# Patient Record
Sex: FEMALE | Race: BLACK OR AFRICAN AMERICAN | Employment: FULL TIME | URBAN - METROPOLITAN AREA
[De-identification: names, ages, dates, MRNs, and addresses within clinical notes are randomized per-mention and may not be internally consistent; named-entity substitution may affect disease eponyms.]

---

## 2021-08-27 ENCOUNTER — HOSPITAL ENCOUNTER (OUTPATIENT)
Dept: PREADMISSION TESTING | Age: 41
Discharge: HOME | End: 2021-08-27
Payer: COMMERCIAL

## 2021-08-27 PROCEDURE — U0005 INFEC AGEN DETEC AMPLI PROBE: HCPCS

## 2021-08-28 LAB — SARS-COV-2, COV2NT: NOT DETECTED

## 2021-08-30 RX ORDER — DEXTROMETHORPHAN/PSEUDOEPHED 2.5-7.5/.8
1.2 DROPS ORAL
Status: CANCELLED | OUTPATIENT
Start: 2021-08-30

## 2021-08-30 RX ORDER — SODIUM CHLORIDE 0.9 % (FLUSH) 0.9 %
5-40 SYRINGE (ML) INJECTION EVERY 8 HOURS
Status: CANCELLED | OUTPATIENT
Start: 2021-08-30

## 2021-08-30 RX ORDER — EPINEPHRINE 0.1 MG/ML
1 INJECTION INTRACARDIAC; INTRAVENOUS
Status: CANCELLED | OUTPATIENT
Start: 2021-08-30 | End: 2021-08-31

## 2021-08-30 RX ORDER — SODIUM CHLORIDE 0.9 % (FLUSH) 0.9 %
5-40 SYRINGE (ML) INJECTION AS NEEDED
Status: CANCELLED | OUTPATIENT
Start: 2021-08-30

## 2021-08-30 RX ORDER — DIPHENHYDRAMINE HYDROCHLORIDE 50 MG/ML
50 INJECTION, SOLUTION INTRAMUSCULAR; INTRAVENOUS ONCE
Status: CANCELLED | OUTPATIENT
Start: 2021-08-30 | End: 2021-08-30

## 2021-08-30 RX ORDER — ATROPINE SULFATE 0.1 MG/ML
0.5 INJECTION INTRAVENOUS
Status: CANCELLED | OUTPATIENT
Start: 2021-08-30 | End: 2021-08-31

## 2021-08-30 NOTE — H&P
Assessment/Plan  # Detail Type Description    1. Assessment Iron deficiency anemia, unspecified (D50.9). Impression Pt of Dr. David Forbes, referred to GI for colonoscopy to evaluate ERIN. She states that she feels this chronic ERIN is most likely due to her heavy and persistent menses. She has never had an EGD or c-scope before. Pt reports having Dizziness and Lightheadedness prior to IV iron infusions (April, May, and one scheduled next Month). Now she only has PICA to ice as remaining anemia symptom. HGB- 6.8L, MCV- 62, %Sat-3L , Ferritin- L, Vit B12- NL, otherwise labs normal (Per Dr. David Forbes)    Average risk, no FHx of colon cancer. Asymptomatic of GI complaints. BMI:  , BM: 2x daily. PM/SH: ADD, Depression, Bipolar, Vit D Deficiency,  (), Ectopic Pregnancy s/p LSO (), L. Breast Biopsy: Negative (). No reported hx of strokes, or CVD. Patient Plan *C-scope Plan:  First colonoscopy ordered with Dr. Juan David Guy with Miralax bowel prep, and Mag Citrate 2 days before prep, and Miralax and stool softeners starting 3 days before prep. *C-scope Risks:  Stressed importance of following all bowel preparation instructions. Explained the procedure to the patient including all risks and benefits. These risks consist of missed lesions on exam, bleeding, and bowel perforation with possible need for admission to the hospital, and in the most extensive of  circumstances, the patient may require surgery. Pt verbalized understanding of these risks and is agreeable with this procedure    Plan Orders Further diagnostic evaluations ordered today include(s) DIAGNOSTIC COLONOSCOPY to be performed. 2. Assessment Body mass index [BMI] 38.0-38.9, adult (L08.04). Impression BMI: 38.5, Pt is obese. This 38year old  patient was referred by Freida Calhoun. This 38year old female presents for Anemia. History of Present Illness  1. Anemia   The anemia began in .  The symptom(s) began gradual. The patient describes it as a severity of severe. Type of anemia was acquired for deficiency anemia (iron deficient). The problem is a new diagnosis. Relevant medical history includes history of anemia. Associated symptoms include cold intolerance, dizziness and pica. Pertinent negatives include abdominal pain, chest pain, constipation, depression, diarrhea, dyspnea, headache, joint pain, nausea, numbness, pallor, vomiting and weight loss. Additional information: BM 2x daily  Iron saturation 3  Ferritin low   HGB 6.8  Pt reports having Dizziness and Lightheadedness prior to IV iron infusions (April, May, and one scheduled next Month). Now she only has PICA to ice as remaining anemia symptom. HGB- 6.8L, MCV- 62, %Sat-3L , Ferritin- L, Vit B12- NL, otherwise labs normal (Per Dr. Sanchez Azul)      Problem List  Problem Description Onset Date Chronic Clinical Status Notes   Acute gastritis 2012 Y     Obesity (BMI 30-39.9) 2014 N     Other specified idiopathic peripheral neuropathy 2013 N     Neuropathy, lateral femoral cutaneous nerve 2013 N     Abnormal SPEP 2013 N     Acute pharyngitis 10/13/2008 N       Past Medical/Surgical History   (Detailed)  Disease/disorder Onset Date Management Date Comments          Ectopic Pregnancy  Left Salpingo-oophorectomy     Breast Biopsy  Breast Biopsy: Negative       Breast biopsy           Family History   (Detailed)    Relationship Family Member Name  Age at Death Condition Onset Age Cause of Death   Brother x4 N  Alive and well  N   Father    Hypertension  N   Father  N  Alive and well  N   Father    Diabetes mellitus  N   Mother    Cancer, breast 40 N   Mother  N  Alive and well  N   Natural Father    Hypertension  N   Natural Mother    Cancer  N   Natural Mother    Breast Cancer  N   Sister x2 N  Alive and well  N     Social History  (Detailed)  Tobacco use reviewed.     Preferred language is [place]. Marital Status/Family/Social Support  Marital status:      Smoking status: Never smoker. Tobacco Screening  Patient has never used tobacco. Patient has not used tobacco in the last 30 days. Patient has not used smokeless tobacco in the last 30 days. Smoking Status  Type Smoking Status Usage Per Day Years Used Pack Years Total Pack Years    Never smoker         Tobacco/Vaping Exposure  No passive smoke exposure. Alcohol  There is no history of alcohol use. Caffeine  The patient uses caffeine: coffee - 5 cups a day. Medications (active prior to today)  Medication Instructions Start Date Stop Date Refilled Elsewhere   Tylenol 325 mg tablet  06/14/2017   N   ibuprofen 200 mg capsule  06/14/2017   N   Ciloxan 0.3 % eye drops Administer 1 drop, every 2 hours, while awake, for 2 days. Then 1 drop, every 4 hours, while awake, for the next 5 days. 07/22/2018 06/22/2021  Y   Adderall 10 mg tablet take 1 tablet by oral route  every day before breakfast //   Y   Zoloft 100 mg tablet take 1 tablet by oral route  every day //   Y   Abilify 5 mg tablet take 1 tablet by oral route  every day //   Y   Vitamin D3 50 mcg (2,000 unit) capsule  //   Y   gabapentin 100 mg capsule take 1 capsule by oral route 3 times every day //   Y   lamotrigine 100 mg tablet take 1 tablet by oral route  every day //   Y   sertraline 100 mg tablet take 1 tablet by oral route  every day //   Y   Adderall XR 30 mg capsule,extended release take 1 capsule by oral route  every day in the morning upon awakening //   Y   simvastatin 10 mg tablet take 1 tablet by oral route  every day in the evening //   Y     Patient Status   Completed with information received for patient in a summary of care record. Medication Reconciliation  Medications reconciled today.     Medication Reviewed  Adherence Medication Name Sig Desc Elsewhere Status   taking as directed Adderall 10 mg tablet take 1 tablet by oral route every day before breakfast Y Verified   taking as directed Tylenol 325 mg tablet  N Verified   taking as directed ibuprofen 200 mg capsule  N Verified   taking as directed Zoloft 100 mg tablet take 1 tablet by oral route  every day Y Verified   taking as directed Abilify 5 mg tablet take 1 tablet by oral route  every day Y Verified   taking as directed Vitamin D3 50 mcg (2,000 unit) capsule  Y Verified     Medications (Added, Continued or Stopped today)  Start Date Medication Directions PRN Status PRN Reason Instruction Stop Date    Abilify 5 mg tablet take 1 tablet by oral route  every day N       Adderall 10 mg tablet take 1 tablet by oral route  every day before breakfast N       Adderall XR 30 mg capsule,extended release take 1 capsule by oral route  every day in the morning upon awakening N      07/22/2018 Ciloxan 0.3 % eye drops Administer 1 drop, every 2 hours, while awake, for 2 days. Then 1 drop, every 4 hours, while awake, for the next 5 days. N   06/22/2021    gabapentin 100 mg capsule take 1 capsule by oral route 3 times every day N      06/14/2017 ibuprofen 200 mg capsule  N       lamotrigine 100 mg tablet take 1 tablet by oral route  every day N       sertraline 100 mg tablet take 1 tablet by oral route  every day N       simvastatin 10 mg tablet take 1 tablet by oral route  every day in the evening N      06/14/2017 Tylenol 325 mg tablet  N       Vitamin D3 50 mcg (2,000 unit) capsule  N       Zoloft 100 mg tablet take 1 tablet by oral route  every day N        Allergies  Ingredient Reaction (Severity) Medication Name Comment   NO KNOWN ALLERGIES            Orders  Status Lab Order Time Frame Comments   scheduled Referral: Lj Mayer MD. Evaluate and treat.     scheduled Referral: José Wilson MD. Evaluate and treat.      ordered hCG,Beta Subunit, Qnt, Serum     ordered hCG,Beta Subunit, Qnt, Serum     ordered Diagnostic mammography digital     ordered US Breast Complete Unilateral     scheduled Referrals: Otolaryngology. Krystal Quach MD. Location: . Evaluate and treat     ordered Rheumatoid Arthritis Factor     ordered Antinuclear Antibodies Direct     ordered Thyroxine (T4) Free, Direct, S     ordered CBC With Differential/Platelet     ordered Comp. Metabolic Panel (14)     ordered Referrals: Audiology. Evaluate and treat     ordered DIAGNOSTIC COLONOSCOPY         Review of Systems  System Neg/Pos Details   Constitutional Negative Fever, Pallor and Weight loss. ENMT Negative Sinus Infection. Eyes Negative Double vision. Respiratory Negative Asthma, Chronic cough and Dyspnea. Cardio Negative Chest pain, Edema and Irregular heartbeat/palpitations. GI Negative Abdominal pain, Change in bowel habits, Constipation, Decreased appetite, Diarrhea, Dysphagia, Heartburn, Hematemesis, Hematochezia, Melena, Nausea, Reflux and Vomiting.  Negative Dysuria and Hematuria. Endocrine Positive Cold intolerance. Endocrine Negative Heat intolerance. Neuro Positive Dizziness. Neuro Negative Headache, Numbness and Tremors. Psych Positive Pica. Psych Negative Anxiety, Depression and Increased stress. Integumentary Negative Hives, Pruritus and Rash. MS Negative Back pain, Joint pain and Myalgia. Hema/Lymph Negative Easy bleeding, Easy bruising and Lymphadenopathy. Allergic/Immuno Negative Food allergies and Immunosuppression. Vital Signs   Height  Time ft in cm Last Measured Height Position   3:32 PM 5.0 4.50 163.83 06/22/2021 Standing     Date/Time Temp Pulse BP Arterial Line 1 BP (mmHg) BP Patient Position Resp SpO2 O2 Device O2 Flow Rate (L/min) Level of Consciousness MEWS Score Weight   09/01/21 1200 -- 72 110/70 -- -- 0Abnormal  99 % -- -- -- -- --   09/01/21 1120 98.5 °F (36.9 °C) 89 111/62 -- -- 16 100 % None (Room air) -- Alert (0) 1 106.5 kg (234 lb 11.2 oz)       Physical  Exam  Exam Findings Details   Constitutional Normal Well developed.    Eyes Normal Conjunctiva - Right: Normal, Left: Normal. Sclera - Right: Normal, Left: Normal.   Nasopharynx Normal Lips/teeth/gums - Normal.   Neck Exam Normal Inspection - Normal.   Respiratory Normal Inspection - Normal.   Cardiovascular Normal Regular rate and rhythm. No murmurs, gallops, or rubs. Vascular Normal Pulses - Brachial: Normal.   Abdomen * Obese. Skin Normal Inspection - Normal.   Musculoskeletal Normal Hands/Wrist - Right: Normal, Left: Normal.   Extremity Normal No edema. Neurological Normal Fine motor skills - Normal.   Psychiatric Normal Orientation - Oriented to time, place, person & situation. Appropriate mood and affect.      Active Patient Care Team Members  Name Contact Agency Type Support Role Relationship Active Date Inactive Date Specialty   [facility] [place]   encounter provider    Physical Therapy   Kelly Davenport   Patient provider PCP      [facility]   encounter provider    Gastroenterology     No change in H&P

## 2021-09-01 ENCOUNTER — HOSPITAL ENCOUNTER (OUTPATIENT)
Age: 41
Setting detail: OUTPATIENT SURGERY
Discharge: HOME | End: 2021-09-01
Attending: INTERNAL MEDICINE | Admitting: INTERNAL MEDICINE
Payer: COMMERCIAL

## 2021-09-01 VITALS
HEART RATE: 83 BPM | HEIGHT: 65 IN | OXYGEN SATURATION: 100 % | BODY MASS INDEX: 39.1 KG/M2 | TEMPERATURE: 97.9 F | SYSTOLIC BLOOD PRESSURE: 112 MMHG | DIASTOLIC BLOOD PRESSURE: 62 MMHG | WEIGHT: 234.7 LBS | RESPIRATION RATE: 18 BRPM

## 2021-09-01 LAB — HCG UR QL: NEGATIVE

## 2021-09-01 PROCEDURE — G0500 MOD SEDAT ENDO SERVICE >5YRS: HCPCS | Performed by: INTERNAL MEDICINE

## 2021-09-01 PROCEDURE — 74011250636 HC RX REV CODE- 250/636: Performed by: INTERNAL MEDICINE

## 2021-09-01 PROCEDURE — 76040000007: Performed by: INTERNAL MEDICINE

## 2021-09-01 PROCEDURE — 77030039961 HC KT CUST COLON BSC -D: Performed by: INTERNAL MEDICINE

## 2021-09-01 PROCEDURE — 2709999900 HC NON-CHARGEABLE SUPPLY: Performed by: INTERNAL MEDICINE

## 2021-09-01 PROCEDURE — 77030040361 HC SLV COMPR DVT MDII -B: Performed by: INTERNAL MEDICINE

## 2021-09-01 PROCEDURE — 81025 URINE PREGNANCY TEST: CPT

## 2021-09-01 RX ORDER — FENTANYL CITRATE 50 UG/ML
100 INJECTION, SOLUTION INTRAMUSCULAR; INTRAVENOUS
Status: DISCONTINUED | OUTPATIENT
Start: 2021-09-01 | End: 2021-09-01 | Stop reason: HOSPADM

## 2021-09-01 RX ORDER — NALOXONE HYDROCHLORIDE 0.4 MG/ML
0.4 INJECTION, SOLUTION INTRAMUSCULAR; INTRAVENOUS; SUBCUTANEOUS
Status: DISCONTINUED | OUTPATIENT
Start: 2021-09-01 | End: 2021-09-01 | Stop reason: HOSPADM

## 2021-09-01 RX ORDER — DEXTROAMPHETAMINE SACCHARATE, AMPHETAMINE ASPARTATE, DEXTROAMPHETAMINE SULFATE AND AMPHETAMINE SULFATE 2.5; 2.5; 2.5; 2.5 MG/1; MG/1; MG/1; MG/1
10 TABLET ORAL DAILY
COMMUNITY

## 2021-09-01 RX ORDER — SERTRALINE HYDROCHLORIDE 100 MG/1
150 TABLET, FILM COATED ORAL DAILY
COMMUNITY

## 2021-09-01 RX ORDER — MIDAZOLAM HYDROCHLORIDE 1 MG/ML
.25-5 INJECTION, SOLUTION INTRAMUSCULAR; INTRAVENOUS
Status: DISCONTINUED | OUTPATIENT
Start: 2021-09-01 | End: 2021-09-01 | Stop reason: HOSPADM

## 2021-09-01 RX ORDER — ERGOCALCIFEROL 1.25 MG/1
50000 CAPSULE ORAL DAILY
COMMUNITY

## 2021-09-01 RX ORDER — FLUMAZENIL 0.1 MG/ML
0.2 INJECTION INTRAVENOUS
Status: DISCONTINUED | OUTPATIENT
Start: 2021-09-01 | End: 2021-09-01 | Stop reason: HOSPADM

## 2021-09-01 RX ORDER — SIMVASTATIN 10 MG/1
10 TABLET, FILM COATED ORAL
COMMUNITY

## 2021-09-01 RX ORDER — ARIPIPRAZOLE 5 MG/1
5 TABLET ORAL DAILY
COMMUNITY

## 2021-09-01 RX ORDER — LAMOTRIGINE 100 MG/1
50 TABLET ORAL 2 TIMES DAILY
COMMUNITY

## 2021-09-01 RX ORDER — SODIUM CHLORIDE 9 MG/ML
1000 INJECTION, SOLUTION INTRAVENOUS CONTINUOUS
Status: DISCONTINUED | OUTPATIENT
Start: 2021-09-01 | End: 2021-09-01 | Stop reason: HOSPADM

## 2021-09-01 RX ORDER — DEXTROAMPHETAMINE SACCHARATE, AMPHETAMINE ASPARTATE, DEXTROAMPHETAMINE SULFATE AND AMPHETAMINE SULFATE 7.5; 7.5; 7.5; 7.5 MG/1; MG/1; MG/1; MG/1
30 TABLET ORAL DAILY
COMMUNITY

## 2021-09-01 NOTE — DISCHARGE INSTRUCTIONS
Tammy Nix  [id number]  1980    COLON DISCHARGE INSTRUCTIONS    Discomfort:  Redness at IV site- apply warm compress to area; if redness or soreness persist- contact your physician  There may be a slight amount of blood passed from the rectum  Gaseous discomfort- walking, belching will help relieve any discomfort  You may not operate a vehicle til the next day. You may not engage in an occupation involving machinery or appliances til the next day. You may not drink alcoholic beverages til the next day. DIET:   High fiber diet. ACTIVITY:  You may not  resume your normal daily activities til the next day. it is recommended that you spend the remainder of the day resting -  avoid any strenuous activity. CALL M.D.  IF ANY SIGN OF:   Increasing pain, nausea, vomiting  Abdominal distension (swelling)  New increased bleeding (oral or rectal)  Fever (chills)  Pain in chest area  Bloody discharge from nose or mouth  Shortness of breath    You may not  take any Advil, Aspirin, Ibuprofen, Motrin, Aleve, or Goodys  ONLY  Tylenol as needed for pain. Post procedure diagnosis:  HEMORRHOID;     Follow-up Instructions: Your follow up colonoscopy will be in 10 years. Rachel Fernandez MD  September 1, 2021     DISCHARGE SUMMARY from Nurse    PATIENT INSTRUCTIONS:    After general anesthesia or intravenous sedation, for 24 hours or while taking prescription Narcotics:  · Limit your activities  · Do not drive and operate hazardous machinery  · Do not make important personal or business decisions  · Do  not drink alcoholic beverages  · If you have not urinated within 8 hours after discharge, please contact your surgeon on call.     Report the following to your surgeon:  · Excessive pain, swelling, redness or odor of or around the surgical area  · Temperature over 100.5  · Nausea and vomiting lasting longer than 4 hours or if unable to take medications  · Any signs of decreased circulation or nerve impairment to extremity: change in color, persistent  numbness, tingling, coldness or increase pain  · Any questions    What to do at Home:  Recommended activity: , as noted above. If you experience any of the following symptoms as noted above, please follow up with Dr. Ina Davis    *  Please give a list of your current medications to your Primary Care Provider. *  Please update this list whenever your medications are discontinued, doses are      changed, or new medications (including over-the-counter products) are added. *  Please carry medication information at all times in case of emergency situations. These are general instructions for a healthy lifestyle:    No smoking/ No tobacco products/ Avoid exposure to second hand smoke  Surgeon General's Warning:  Quitting smoking now greatly reduces serious risk to your health. Obesity, smoking, and sedentary lifestyle greatly increases your risk for illness    A healthy diet, regular physical exercise & weight monitoring are important for maintaining a healthy lifestyle    You may be retaining fluid if you have a history of heart failure or if you experience any of the following symptoms:  Weight gain of 3 pounds or more overnight or 5 pounds in a week, increased swelling in our hands or feet or shortness of breath while lying flat in bed. Please call your doctor as soon as you notice any of these symptoms; do not wait until your next office visit. Patient armband removed and shredded    The discharge information has been reviewed with the patient and spouse. The patient and spouse verbalized understanding. Discharge medications reviewed with the patient and spouse and appropriate educational materials and side effects teaching were provided.   ___________________________________________________________________________________________________________________________________

## 2021-09-01 NOTE — PROCEDURES
[facility]  Colonoscopy Procedure Report  _______________________________________________________  Patient: Alex Montes De Oca                                        Attending Physician: Julia Banks MD    Patient ID: [id number]                                    Referring Physician: Moris Ramos MD    Exam Date: 2021      Introduction: A  38 y.o. female patient, presents for inpatient Colonoscopy    Indications: Pt of Dr. Sanchez Azul, referred to GI for colonoscopy to evaluate ERIN. She states that she feels this chronic ERIN is most likely due to her heavy and persistent menses. She has never had an EGD or c-scope before. Pt reports having Dizziness and Lightheadedness prior to IV iron infusions (April, May, and one scheduled next Month). Now she only has PICA to ice as remaining anemia symptom. HGB- 6.8L, MCV- 62, %Sat-3L , Ferritin- L, Vit B12- NL, otherwise labs normal (Per Dr. Sanchez Azul)  Average risk, no FHx of colon cancer. Asymptomatic of GI complaints. BMI:  , BM: 2x daily. PM/SH: ADD, Depression, Bipolar, Vit D Deficiency,  (), Ectopic Pregnancy s/p LSO (), L. Breast Biopsy: Negative (). No reported hx of strokes, or CVD. No NSAID's. GERD x 10 years almost every 2 days treated with OTC medication. No dysphagia, nausea or vomiting. Consent: The benefits, risks, and alternatives to the procedure were discussed and informed consent was obtained from the patient. Preparation: EKG, pulse, pulse oximetry and blood pressure were monitored throughout the procedure. ASA Classification: Class I- . The heart is an S1-S2 and regular heart rate and rhythm. Lungs are clear to auscultation and percussion. Abdomen is soft, nondistended, and nontender. Mental Status: awake, alert, and oriented to person, place, and time    Medications:  · Fentanyl 100 mcg IV before procedure. · Versed 5 mg IV throughout the procedure. Rectal Exam: Normal Rectal Exam. No Blood. Pathology Specimens:  0    Procedure: The colonoscope was passed with mild difficulty through the anus under direct visualization and advanced to the cecum and 5 cm inside the terminal ileum. Retroflexion is made in the ascending colon. The patient required positioning on the back to aid in the passage of the scope. The scope was withdrawn and the mucosa was carefully examined. The quality of the preparation was excellent. The views were excellent. The patient's toleration of the procedure was excellent. The exam was done twice to the cecum. Total time is 17 minutes and withdrawal time is 16 minutes. Findings:    Rectum:   Small internal hemorrhoids. Sigmoid:   Slightly long and loopy sigmoid colon. Descending Colon:   Normal   Transverse Colon:   Normal   Ascending Colon:   Normal  Cecum:   Normal  Terminal Ileum:   Normal.     Unplanned Events: There were no unplanned events. Estimated Blood Loss: None  IMPLANTS: * No implants in log *  Impressions: Small internal hemorrhoids. Slightly long and loopy sigmoid colon. Normal Mucosa. No blood, polyps or AVM found. Complications: None; patient tolerated the procedure well. Recommendations:  · Discharge home when standard parameters are met. · Resume a high fiber diet. · Resume own medications. Avoid all NSAID's  · Colonoscopy recommendation in 10 years. · Take Miralax and/ or Colace 100 mg on regular basis if constipated  · Do Urea breath test for H Pylori.  Monitor the Hgb/ Hct consider doing an EGD    Procedure Codes:     COLONOSCOPY [[id number] (Type: Custom)]    Endoscope Information:  Model Number(s)    [id number]   Assistant: None  Signed By: Nadia Gomez MD Date: 9/1/2021

## (undated) DEVICE — NDL PRT INJ NSAF BLNT 18GX1.5 --

## (undated) DEVICE — KENDALL RADIOLUCENT FOAM MONITORING ELECTRODE RECTANGULAR SHAPE: Brand: KENDALL

## (undated) DEVICE — SYR 5ML 1/5 GRAD LL NSAF LF --

## (undated) DEVICE — CANNULA CUSH AD W/ 14FT TBG

## (undated) DEVICE — TRNQT TEXT 1X18IN BLU LF DISP -- CONVERT TO ITEM 362165

## (undated) DEVICE — SYRINGE 50ML E/T

## (undated) DEVICE — CATH SUC CTRL PRT TRIFLO 14FR --

## (undated) DEVICE — TRAP SPEC COLL POLYP POLYSTYR --

## (undated) DEVICE — TUBING, SUCTION, 1/4" X 12', STRAIGHT: Brand: MEDLINE

## (undated) DEVICE — SOLUTION IV 500ML 0.9% SOD CHL FLX CONT

## (undated) DEVICE — GARMENT,MEDLINE,DVT,INT,CALF,MED, GEN2: Brand: MEDLINE

## (undated) DEVICE — WRISTBAND ID AD W2.5XL9.5CM RED VYN ADH CLSR UNI-PRINT

## (undated) DEVICE — SPONGE GZ W4XL4IN RAYON POLY 4 PLY NONWOVEN FASTER WICKING

## (undated) DEVICE — SPONGE GZ W4XL4IN COT 12 PLY TYP VII WVN C FLD DSGN

## (undated) DEVICE — SYR 3ML LL TIP 1/10ML GRAD --

## (undated) DEVICE — Device

## (undated) DEVICE — CATH IV SAFE STR 22GX1IN BLU -- PROTECTIV PLUS

## (undated) DEVICE — SET ADMIN 16ML TBNG L100IN 2 Y INJ SITE IV PIGGY BK DISP

## (undated) DEVICE — MAJ-1414 SINGLE USE ADPATER BIOPSY VALV: Brand: SINGLE USE ADAPTOR BIOPSY VALVE

## (undated) DEVICE — NDL FLTR TIP 5 MIC 18GX1.5IN --

## (undated) DEVICE — SINGLE PORT MANIFOLD: Brand: NEPTUNE 2